# Patient Record
Sex: MALE | Race: WHITE | Employment: OTHER | URBAN - METROPOLITAN AREA
[De-identification: names, ages, dates, MRNs, and addresses within clinical notes are randomized per-mention and may not be internally consistent; named-entity substitution may affect disease eponyms.]

---

## 2017-02-08 ENCOUNTER — ANESTHESIA EVENT (OUTPATIENT)
Dept: SURGERY | Age: 82
End: 2017-02-08
Payer: MEDICARE

## 2017-02-09 ENCOUNTER — SURGERY (OUTPATIENT)
Age: 82
End: 2017-02-09

## 2017-02-09 ENCOUNTER — ANESTHESIA (OUTPATIENT)
Dept: SURGERY | Age: 82
End: 2017-02-09
Payer: MEDICARE

## 2017-02-09 PROCEDURE — 77030008683 HC TU ET CUF COVD -A: Performed by: NURSE ANESTHETIST, CERTIFIED REGISTERED

## 2017-02-09 PROCEDURE — 77030008477 HC STYL SATN SLP COVD -A: Performed by: NURSE ANESTHETIST, CERTIFIED REGISTERED

## 2017-02-09 PROCEDURE — 77030013079 HC BLNKT BAIR HGGR 3M -A: Performed by: NURSE ANESTHETIST, CERTIFIED REGISTERED

## 2017-02-09 PROCEDURE — 74011250636 HC RX REV CODE- 250/636: Performed by: UROLOGY

## 2017-02-09 PROCEDURE — 74011250636 HC RX REV CODE- 250/636

## 2017-02-09 PROCEDURE — 74011000250 HC RX REV CODE- 250

## 2017-02-09 RX ORDER — LIDOCAINE HYDROCHLORIDE 20 MG/ML
INJECTION, SOLUTION EPIDURAL; INFILTRATION; INTRACAUDAL; PERINEURAL AS NEEDED
Status: DISCONTINUED | OUTPATIENT
Start: 2017-02-09 | End: 2017-02-09 | Stop reason: HOSPADM

## 2017-02-09 RX ORDER — PROPOFOL 10 MG/ML
INJECTION, EMULSION INTRAVENOUS AS NEEDED
Status: DISCONTINUED | OUTPATIENT
Start: 2017-02-09 | End: 2017-02-09 | Stop reason: HOSPADM

## 2017-02-09 RX ORDER — FENTANYL CITRATE 50 UG/ML
INJECTION, SOLUTION INTRAMUSCULAR; INTRAVENOUS AS NEEDED
Status: DISCONTINUED | OUTPATIENT
Start: 2017-02-09 | End: 2017-02-09 | Stop reason: HOSPADM

## 2017-02-09 RX ORDER — NEOSTIGMINE METHYLSULFATE 5 MG/5 ML
SYRINGE (ML) INTRAVENOUS AS NEEDED
Status: DISCONTINUED | OUTPATIENT
Start: 2017-02-09 | End: 2017-02-09 | Stop reason: HOSPADM

## 2017-02-09 RX ORDER — GLYCOPYRROLATE 0.2 MG/ML
INJECTION INTRAMUSCULAR; INTRAVENOUS AS NEEDED
Status: DISCONTINUED | OUTPATIENT
Start: 2017-02-09 | End: 2017-02-09 | Stop reason: HOSPADM

## 2017-02-09 RX ORDER — ROCURONIUM BROMIDE 10 MG/ML
INJECTION, SOLUTION INTRAVENOUS AS NEEDED
Status: DISCONTINUED | OUTPATIENT
Start: 2017-02-09 | End: 2017-02-09 | Stop reason: HOSPADM

## 2017-02-09 RX ADMIN — CIPROFLOXACIN 400 MG: 2 INJECTION, SOLUTION INTRAVENOUS at 08:51

## 2017-02-09 RX ADMIN — Medication 3 MG: at 10:08

## 2017-02-09 RX ADMIN — SODIUM CHLORIDE 21000 ML: 900 IRRIGANT IRRIGATION at 10:10

## 2017-02-09 RX ADMIN — FENTANYL CITRATE 50 MCG: 50 INJECTION, SOLUTION INTRAMUSCULAR; INTRAVENOUS at 08:39

## 2017-02-09 RX ADMIN — ROCURONIUM BROMIDE 10 MG: 10 INJECTION, SOLUTION INTRAVENOUS at 09:31

## 2017-02-09 RX ADMIN — ROCURONIUM BROMIDE 40 MG: 10 INJECTION, SOLUTION INTRAVENOUS at 08:39

## 2017-02-09 RX ADMIN — FENTANYL CITRATE 50 MCG: 50 INJECTION, SOLUTION INTRAMUSCULAR; INTRAVENOUS at 09:15

## 2017-02-09 RX ADMIN — LIDOCAINE HYDROCHLORIDE 100 MG: 20 INJECTION, SOLUTION EPIDURAL; INFILTRATION; INTRACAUDAL; PERINEURAL at 08:39

## 2017-02-09 RX ADMIN — GLYCOPYRROLATE 0.4 MG: 0.2 INJECTION INTRAMUSCULAR; INTRAVENOUS at 10:08

## 2017-02-09 RX ADMIN — PROPOFOL 100 MG: 10 INJECTION, EMULSION INTRAVENOUS at 08:39

## 2017-02-09 NOTE — ANESTHESIA PREPROCEDURE EVALUATION
Anesthetic History   No history of anesthetic complications            Review of Systems / Medical History  Patient summary reviewed and pertinent labs reviewed    Pulmonary                Comments: Current Smoker? NO       Elective Surgery? Yes       Abstained from smoking 24 hours prior to anesthesia? N/A    Risk Factors for Postoperative nausea/vomiting:       History of postoperative nausea/vomiting? NO       Female? NO       Motion sickness? NO       Intended opioid administration for postoperative analgesia?   YES   Neuro/Psych   Within defined limits           Cardiovascular    Hypertension        Dysrhythmias       Exercise tolerance: <4 METS     GI/Hepatic/Renal         Renal disease: CRI       Endo/Other    Diabetes: type 2    Arthritis     Other Findings              Physical Exam    Airway  Mallampati: II  TM Distance: 4 - 6 cm  Neck ROM: decreased range of motion   Mouth opening: Diminished (comment)     Cardiovascular    Rhythm: irregular  Rate: normal         Dental    Dentition: Lower partial plate and Poor dentition     Pulmonary      Decreased breath sounds: bilateral           Abdominal  GI exam deferred       Other Findings            Anesthetic Plan    ASA: 3  Anesthesia type: general          Induction: Intravenous  Anesthetic plan and risks discussed with: Patient      EF 54%

## 2021-10-29 NOTE — ANESTHESIA POSTPROCEDURE EVALUATION
[facility] called requesting a refill of the below medication which has been pended for you:     Requested Prescriptions     Pending Prescriptions Disp Refills    glyBURIDE (DIABETA) 5 MG tablet [Pharmacy Med Name: GLYBURIDE 5 MG TABLET 5 Tablet] 30 tablet 3     Sig: TAKE 1 TABLET BY MOUTH DAILY (WITH BREAKFAST)       Last Appointment Date: 7/23/2021  Next Appointment Date: Visit date not found    No Known Allergies Post-Anesthesia Evaluation and Assessment    Patient: Curtis Marquis MRN: [id number]  SSN: [id number]    YOB: 1933  Age: 80 y.o. Sex: male     VS from flow sheet    Cardiovascular Function/Vital Signs  Visit Vitals    /82    Pulse (!) 50    Temp 36.3 °C (97.4 °F)    Resp 12    Ht 5' 11\" (1.803 m)    Wt 85.8 kg (189 lb 3 oz)    SpO2 99%    BMI 26.39 kg/m2       Patient is status post general anesthesia for Procedure(s):  cystoscopy,protouch laser ENUCLEATION OF PROSTATE with tissue morcellation. Nausea/Vomiting: None    Postoperative hydration reviewed and adequate. Pain:  Pain Scale 1: Numeric (0 - 10) (02/09/17 1106)  Pain Intensity 1: 0 (02/09/17 1106)   Managed    Neurological Status:   Neuro (WDL): Within Defined Limits (02/09/17 1106)   At baseline    Mental Status and Level of Consciousness: Arousable    Pulmonary Status:   O2 Device: Room air (02/09/17 1106)   Adequate oxygenation and airway patent    Complications related to anesthesia: None    Post-anesthesia assessment completed.  No concerns    Signed By: Scott Betancourt MD     February 9, 2017